# Patient Record
Sex: FEMALE | Race: ASIAN | NOT HISPANIC OR LATINO
[De-identification: names, ages, dates, MRNs, and addresses within clinical notes are randomized per-mention and may not be internally consistent; named-entity substitution may affect disease eponyms.]

---

## 2017-01-24 PROBLEM — Z00.00 ENCOUNTER FOR PREVENTIVE HEALTH EXAMINATION: Status: ACTIVE | Noted: 2017-01-24

## 2017-01-30 ENCOUNTER — APPOINTMENT (OUTPATIENT)
Dept: CARDIOLOGY | Facility: CLINIC | Age: 67
End: 2017-01-30

## 2017-01-30 ENCOUNTER — FORM ENCOUNTER (OUTPATIENT)
Age: 67
End: 2017-01-30

## 2017-01-30 ENCOUNTER — NON-APPOINTMENT (OUTPATIENT)
Age: 67
End: 2017-01-30

## 2017-01-30 VITALS
WEIGHT: 119 LBS | SYSTOLIC BLOOD PRESSURE: 151 MMHG | TEMPERATURE: 98.4 F | BODY MASS INDEX: 20.32 KG/M2 | HEIGHT: 64 IN | HEART RATE: 71 BPM | RESPIRATION RATE: 17 BRPM | DIASTOLIC BLOOD PRESSURE: 89 MMHG | OXYGEN SATURATION: 97 %

## 2017-01-30 DIAGNOSIS — Z80.1 FAMILY HISTORY OF MALIGNANT NEOPLASM OF TRACHEA, BRONCHUS AND LUNG: ICD-10-CM

## 2017-01-30 DIAGNOSIS — E78.5 HYPERLIPIDEMIA, UNSPECIFIED: ICD-10-CM

## 2017-01-30 DIAGNOSIS — C50.919 MALIGNANT NEOPLASM OF UNSPECIFIED SITE OF UNSPECIFIED FEMALE BREAST: ICD-10-CM

## 2017-01-31 ENCOUNTER — RESULT REVIEW (OUTPATIENT)
Age: 67
End: 2017-01-31

## 2017-01-31 ENCOUNTER — OTHER (OUTPATIENT)
Age: 67
End: 2017-01-31

## 2017-02-25 PROBLEM — E78.5 HLD (HYPERLIPIDEMIA): Status: ACTIVE | Noted: 2017-02-25

## 2017-02-25 PROBLEM — Z80.1 FAMILY HISTORY OF LUNG CANCER: Status: ACTIVE | Noted: 2017-02-25

## 2017-02-25 PROBLEM — C50.919 BREAST CANCER: Status: ACTIVE | Noted: 2017-02-25

## 2017-02-25 RX ORDER — OMEPRAZOLE 40 MG/1
40 CAPSULE, DELAYED RELEASE ORAL
Refills: 0 | Status: ACTIVE | COMMUNITY

## 2017-07-26 ENCOUNTER — APPOINTMENT (OUTPATIENT)
Dept: CARDIOLOGY | Facility: CLINIC | Age: 67
End: 2017-07-26

## 2017-07-26 VITALS
BODY MASS INDEX: 21.46 KG/M2 | WEIGHT: 125 LBS | HEART RATE: 65 BPM | DIASTOLIC BLOOD PRESSURE: 80 MMHG | OXYGEN SATURATION: 96 % | SYSTOLIC BLOOD PRESSURE: 169 MMHG | RESPIRATION RATE: 18 BRPM

## 2017-08-10 ENCOUNTER — MESSAGE (OUTPATIENT)
Age: 67
End: 2017-08-10

## 2017-12-22 ENCOUNTER — APPOINTMENT (OUTPATIENT)
Dept: CARDIOLOGY | Facility: CLINIC | Age: 67
End: 2017-12-22

## 2018-01-16 ENCOUNTER — NON-APPOINTMENT (OUTPATIENT)
Age: 68
End: 2018-01-16

## 2018-01-16 ENCOUNTER — APPOINTMENT (OUTPATIENT)
Dept: CARDIOLOGY | Facility: CLINIC | Age: 68
End: 2018-01-16
Payer: MEDICARE

## 2018-01-16 VITALS
HEART RATE: 59 BPM | WEIGHT: 122 LBS | OXYGEN SATURATION: 97 % | RESPIRATION RATE: 18 BRPM | BODY MASS INDEX: 20.94 KG/M2 | DIASTOLIC BLOOD PRESSURE: 90 MMHG | TEMPERATURE: 98 F | SYSTOLIC BLOOD PRESSURE: 165 MMHG

## 2018-01-16 PROCEDURE — 93000 ELECTROCARDIOGRAM COMPLETE: CPT

## 2018-01-16 PROCEDURE — 99214 OFFICE O/P EST MOD 30 MIN: CPT

## 2018-04-16 ENCOUNTER — APPOINTMENT (OUTPATIENT)
Dept: CARDIOLOGY | Facility: CLINIC | Age: 68
End: 2018-04-16
Payer: MEDICARE

## 2018-04-16 VITALS
HEART RATE: 66 BPM | WEIGHT: 123 LBS | BODY MASS INDEX: 21.11 KG/M2 | DIASTOLIC BLOOD PRESSURE: 81 MMHG | OXYGEN SATURATION: 97 % | SYSTOLIC BLOOD PRESSURE: 154 MMHG | TEMPERATURE: 98.3 F | RESPIRATION RATE: 18 BRPM

## 2018-04-16 PROCEDURE — 99214 OFFICE O/P EST MOD 30 MIN: CPT

## 2018-09-12 ENCOUNTER — APPOINTMENT (OUTPATIENT)
Dept: CARDIOLOGY | Facility: CLINIC | Age: 68
End: 2018-09-12
Payer: MEDICARE

## 2018-09-12 ENCOUNTER — NON-APPOINTMENT (OUTPATIENT)
Age: 68
End: 2018-09-12

## 2018-09-12 VITALS
BODY MASS INDEX: 20.6 KG/M2 | RESPIRATION RATE: 18 BRPM | OXYGEN SATURATION: 97 % | TEMPERATURE: 97.9 F | WEIGHT: 120 LBS | DIASTOLIC BLOOD PRESSURE: 74 MMHG | HEART RATE: 61 BPM | SYSTOLIC BLOOD PRESSURE: 150 MMHG

## 2018-09-12 DIAGNOSIS — R42 DIZZINESS AND GIDDINESS: ICD-10-CM

## 2018-09-12 PROCEDURE — 99214 OFFICE O/P EST MOD 30 MIN: CPT

## 2018-09-12 PROCEDURE — 93000 ELECTROCARDIOGRAM COMPLETE: CPT

## 2018-09-13 ENCOUNTER — RESULT REVIEW (OUTPATIENT)
Age: 68
End: 2018-09-13

## 2019-01-07 ENCOUNTER — APPOINTMENT (OUTPATIENT)
Dept: CARDIOLOGY | Facility: CLINIC | Age: 69
End: 2019-01-07
Payer: MEDICARE

## 2019-01-07 VITALS
SYSTOLIC BLOOD PRESSURE: 152 MMHG | DIASTOLIC BLOOD PRESSURE: 78 MMHG | WEIGHT: 126 LBS | OXYGEN SATURATION: 98 % | TEMPERATURE: 97.7 F | BODY MASS INDEX: 21.63 KG/M2 | RESPIRATION RATE: 18 BRPM | HEART RATE: 56 BPM

## 2019-01-07 DIAGNOSIS — R00.2 PALPITATIONS: ICD-10-CM

## 2019-01-07 PROCEDURE — 99214 OFFICE O/P EST MOD 30 MIN: CPT

## 2019-01-07 NOTE — PHYSICAL EXAM
[General Appearance - Well Developed] : well developed [Normal Appearance] : normal appearance [Well Groomed] : well groomed [General Appearance - Well Nourished] : well nourished [No Deformities] : no deformities [General Appearance - In No Acute Distress] : no acute distress [Normal Conjunctiva] : the conjunctiva exhibited no abnormalities [Eyelids - No Xanthelasma] : the eyelids demonstrated no xanthelasmas [Normal Oral Mucosa] : normal oral mucosa [No Oral Pallor] : no oral pallor [No Oral Cyanosis] : no oral cyanosis [Normal Jugular Venous A Waves Present] : normal jugular venous A waves present [Normal Jugular Venous V Waves Present] : normal jugular venous V waves present [No Jugular Venous Hanley A Waves] : no jugular venous hanley A waves [Respiration, Rhythm And Depth] : normal respiratory rhythm and effort [Exaggerated Use Of Accessory Muscles For Inspiration] : no accessory muscle use [Auscultation Breath Sounds / Voice Sounds] : lungs were clear to auscultation bilaterally [Heart Rate And Rhythm] : heart rate and rhythm were normal [Heart Sounds] : normal S1 and S2 [Murmurs] : no murmurs present [Arterial Pulses Normal] : the arterial pulses were normal [Edema] : no peripheral edema present [Abdomen Soft] : soft [Abdomen Tenderness] : non-tender [Abdomen Mass (___ Cm)] : no abdominal mass palpated [Abnormal Walk] : normal gait [Gait - Sufficient For Exercise Testing] : the gait was sufficient for exercise testing [Nail Clubbing] : no clubbing of the fingernails [Cyanosis, Localized] : no localized cyanosis [Petechial Hemorrhages (___cm)] : no petechial hemorrhages [] : no ischemic changes [Oriented To Time, Place, And Person] : oriented to person, place, and time [Affect] : the affect was normal [Mood] : the mood was normal [No Anxiety] : not feeling anxious

## 2019-01-17 PROBLEM — R00.2 PALPITATIONS: Status: ACTIVE | Noted: 2017-01-30

## 2019-07-14 NOTE — HISTORY OF PRESENT ILLNESS
[FreeTextEntry1] : 68 -year-old female with PAT on Holter, HTN and HLD presents for followup.  Patient was last seen on 9/12/18 for dizziness.  Patient underwent a brain MRI showed no acute infarct and mild-mod small vessel ischemic changes.  Her symptom was felt to be due to vestibular dysfunction.  She is on Amlodipine/Benazepril to 5-10 mg for HTN.  She is on Metoprolol ER 25 mg for PAT on Holter.  Patient has been stable.  Patient reports that her BP is usually 110-130.  Patient denies CP, SOB, palpitations, or lightheadedness.  She reports that the tightness around her neck is better.

## 2019-07-14 NOTE — PHYSICAL EXAM
[General Appearance - Well Developed] : well developed [Normal Appearance] : normal appearance [Well Groomed] : well groomed [General Appearance - Well Nourished] : well nourished [No Deformities] : no deformities [General Appearance - In No Acute Distress] : no acute distress [Normal Conjunctiva] : the conjunctiva exhibited no abnormalities [Eyelids - No Xanthelasma] : the eyelids demonstrated no xanthelasmas [No Oral Pallor] : no oral pallor [Normal Oral Mucosa] : normal oral mucosa [No Oral Cyanosis] : no oral cyanosis [Normal Jugular Venous A Waves Present] : normal jugular venous A waves present [Normal Jugular Venous V Waves Present] : normal jugular venous V waves present [No Jugular Venous Hanley A Waves] : no jugular venous hanley A waves [Respiration, Rhythm And Depth] : normal respiratory rhythm and effort [Exaggerated Use Of Accessory Muscles For Inspiration] : no accessory muscle use [Auscultation Breath Sounds / Voice Sounds] : lungs were clear to auscultation bilaterally [Heart Rate And Rhythm] : heart rate and rhythm were normal [Heart Sounds] : normal S1 and S2 [Murmurs] : no murmurs present [Arterial Pulses Normal] : the arterial pulses were normal [Edema] : no peripheral edema present [Abdomen Soft] : soft [Abdomen Tenderness] : non-tender [Abdomen Mass (___ Cm)] : no abdominal mass palpated [Abnormal Walk] : normal gait [Gait - Sufficient For Exercise Testing] : the gait was sufficient for exercise testing [Nail Clubbing] : no clubbing of the fingernails [Cyanosis, Localized] : no localized cyanosis [Petechial Hemorrhages (___cm)] : no petechial hemorrhages [] : no ischemic changes [Oriented To Time, Place, And Person] : oriented to person, place, and time [Affect] : the affect was normal [Mood] : the mood was normal [No Anxiety] : not feeling anxious

## 2019-07-14 NOTE — DISCUSSION/SUMMARY
[FreeTextEntry1] : 68 -year-old female with PAT on Holter, HTN and HLD presents for followup.  Patient was last seen on 9/12/18 for dizziness.  Patient underwent a brain MRI showed no acute infarct and mild-mod small vessel ischemic changes.  Her symptom was felt to be due to vestibular dysfunction.  She is on Amlodipine/Benazepril to 5-10 mg for HTN.  She is on Metoprolol ER 25 mg for PAT on Holter.  Patient has been stable.  Patient reports that her BP is usually 110-130.  Patient denies CP, SOB, palpitations, or lightheadedness.  She reports that the tightness around her neck is better. \par \par (1) HTN - Her BP was elevated in the office but was normal at home.  She brought in her BP machine on 1/7/19  and it was verified to be accurate.  I advised patient to continue Amlodipine/Benazepril to 5-10 mg and Metoprolol ER 25 mg for now.  \par \par (2) PAT on Holter - Patient has been stable.  I advised patient to continue Metoprolol ER 25 mg.\par  \par (3) Followup - 6 months.

## 2019-07-15 ENCOUNTER — APPOINTMENT (OUTPATIENT)
Dept: CARDIOLOGY | Facility: CLINIC | Age: 69
End: 2019-07-15
Payer: MEDICARE

## 2019-07-15 VITALS
TEMPERATURE: 97.6 F | RESPIRATION RATE: 18 BRPM | SYSTOLIC BLOOD PRESSURE: 160 MMHG | BODY MASS INDEX: 21.46 KG/M2 | WEIGHT: 125 LBS | HEART RATE: 59 BPM | OXYGEN SATURATION: 95 % | DIASTOLIC BLOOD PRESSURE: 80 MMHG

## 2019-07-15 PROCEDURE — 99214 OFFICE O/P EST MOD 30 MIN: CPT

## 2020-01-01 NOTE — PHYSICAL EXAM
[General Appearance - Well Developed] : well developed [Normal Appearance] : normal appearance [Well Groomed] : well groomed [General Appearance - Well Nourished] : well nourished [No Deformities] : no deformities [General Appearance - In No Acute Distress] : no acute distress [Normal Conjunctiva] : the conjunctiva exhibited no abnormalities [Eyelids - No Xanthelasma] : the eyelids demonstrated no xanthelasmas [Normal Oral Mucosa] : normal oral mucosa [No Oral Cyanosis] : no oral cyanosis [No Oral Pallor] : no oral pallor [Normal Jugular Venous A Waves Present] : normal jugular venous A waves present [Normal Jugular Venous V Waves Present] : normal jugular venous V waves present [No Jugular Venous Hanley A Waves] : no jugular venous hanley A waves [Respiration, Rhythm And Depth] : normal respiratory rhythm and effort [Exaggerated Use Of Accessory Muscles For Inspiration] : no accessory muscle use [Auscultation Breath Sounds / Voice Sounds] : lungs were clear to auscultation bilaterally [Murmurs] : no murmurs present [Heart Sounds] : normal S1 and S2 [Heart Rate And Rhythm] : heart rate and rhythm were normal [Edema] : no peripheral edema present [Arterial Pulses Normal] : the arterial pulses were normal [Abdomen Soft] : soft [Abdomen Tenderness] : non-tender [Abdomen Mass (___ Cm)] : no abdominal mass palpated [Abnormal Walk] : normal gait [Gait - Sufficient For Exercise Testing] : the gait was sufficient for exercise testing [Nail Clubbing] : no clubbing of the fingernails [Petechial Hemorrhages (___cm)] : no petechial hemorrhages [Cyanosis, Localized] : no localized cyanosis [] : no ischemic changes [Affect] : the affect was normal [Oriented To Time, Place, And Person] : oriented to person, place, and time [Mood] : the mood was normal [No Anxiety] : not feeling anxious

## 2020-01-01 NOTE — HISTORY OF PRESENT ILLNESS
[FreeTextEntry1] : 68 -year-old female with PAT on Holter, HTN and HLD presents for followup.  Patient was last seen on 1/7/19.  She is on Amlodipine/Benazepril to 5-10 mg for HTN.  She is on Metoprolol ER 25 mg for PAT on Holter.  Patient is feeling well.  Patient denies CP, SOB, palpitations, or lightheadedness.  She hikes and does yoga without issues.  Patient reports that her SBP is normal at home 110-140.

## 2020-01-01 NOTE — DISCUSSION/SUMMARY
[FreeTextEntry1] : 68 -year-old female with PAT on Holter, HTN and HLD presents for followup.  Patient was last seen on 1/7/19.  She is on Amlodipine/Benazepril to 5-10 mg for HTN.  She is on Metoprolol ER 25 mg for PAT on Holter.  Patient is feeling well.  Patient denies CP, SOB, palpitations, or lightheadedness.  She hikes and does yoga without issues.  Patient reports that her SBP is normal at home 110-140. \par \par (1) HTN - Her BP was elevated in the office but was normal at home.  She brought in her BP machine on 1/7/19  and it was verified to be accurate.  I advised patient to continue Amlodipine/Benazepril  5-10 mg and Metoprolol ER 25 mg for now.  \par \par (2) PAT on Holter - Patient has been stable.  I advised patient to continue Metoprolol ER 25 mg.\par  \par (3) Followup - 6 months.

## 2020-01-02 ENCOUNTER — NON-APPOINTMENT (OUTPATIENT)
Age: 70
End: 2020-01-02

## 2020-01-02 ENCOUNTER — APPOINTMENT (OUTPATIENT)
Dept: CARDIOLOGY | Facility: CLINIC | Age: 70
End: 2020-01-02
Payer: MEDICARE

## 2020-01-02 VITALS
DIASTOLIC BLOOD PRESSURE: 83 MMHG | HEART RATE: 66 BPM | TEMPERATURE: 98.3 F | OXYGEN SATURATION: 95 % | SYSTOLIC BLOOD PRESSURE: 150 MMHG | RESPIRATION RATE: 18 BRPM | WEIGHT: 124 LBS | BODY MASS INDEX: 21.28 KG/M2

## 2020-01-02 PROCEDURE — 93000 ELECTROCARDIOGRAM COMPLETE: CPT

## 2020-01-02 PROCEDURE — 99214 OFFICE O/P EST MOD 30 MIN: CPT

## 2020-08-12 NOTE — PHYSICAL EXAM
[General Appearance - Well Developed] : well developed [Normal Appearance] : normal appearance [General Appearance - Well Nourished] : well nourished [Well Groomed] : well groomed [Normal Conjunctiva] : the conjunctiva exhibited no abnormalities [No Deformities] : no deformities [General Appearance - In No Acute Distress] : no acute distress [Eyelids - No Xanthelasma] : the eyelids demonstrated no xanthelasmas [No Oral Pallor] : no oral pallor [Normal Oral Mucosa] : normal oral mucosa [Normal Jugular Venous V Waves Present] : normal jugular venous V waves present [Normal Jugular Venous A Waves Present] : normal jugular venous A waves present [No Oral Cyanosis] : no oral cyanosis [No Jugular Venous Hanley A Waves] : no jugular venous hanley A waves [Respiration, Rhythm And Depth] : normal respiratory rhythm and effort [Heart Rate And Rhythm] : heart rate and rhythm were normal [Exaggerated Use Of Accessory Muscles For Inspiration] : no accessory muscle use [Auscultation Breath Sounds / Voice Sounds] : lungs were clear to auscultation bilaterally [Arterial Pulses Normal] : the arterial pulses were normal [Murmurs] : no murmurs present [Heart Sounds] : normal S1 and S2 [Edema] : no peripheral edema present [Abdomen Tenderness] : non-tender [Abdomen Soft] : soft [Abnormal Walk] : normal gait [Gait - Sufficient For Exercise Testing] : the gait was sufficient for exercise testing [Abdomen Mass (___ Cm)] : no abdominal mass palpated [Cyanosis, Localized] : no localized cyanosis [Nail Clubbing] : no clubbing of the fingernails [Petechial Hemorrhages (___cm)] : no petechial hemorrhages [Affect] : the affect was normal [] : no ischemic changes [Oriented To Time, Place, And Person] : oriented to person, place, and time [No Anxiety] : not feeling anxious [Mood] : the mood was normal

## 2020-08-12 NOTE — HISTORY OF PRESENT ILLNESS
[FreeTextEntry1] : 69 -year-old female with PAT on Holter, HTN and HLD presents for followup.  Patient was last seen on 7/15/19.  She is on Amlodipine/Benazepril to 5-10 mg for HTN.  She is on Metoprolol ER 25 mg for PAT on Holter.  \par \par Patient reports feeling well.  Patient denies CP, SOB, palpitations, or lightheadedness.  Her SBP at home is between 108-147.  Her machine was verified to be accurate on 1/7/19.  She reports feeling more fatigued.  Patient walks daily and goes hiking.

## 2020-08-12 NOTE — DISCUSSION/SUMMARY
[FreeTextEntry1] : 69 -year-old female with PAT on Holter, HTN and HLD presents for followup.  Patient was last seen on 7/15/19.  She is on Amlodipine/Benazepril to 5-10 mg for HTN.  She is on Metoprolol ER 25 mg for PAT on Holter.  \par \par Patient reports feeling well.  Patient denies CP, SOB, palpitations, or lightheadedness.  Her SBP at home is between 108-147.  Her machine was verified to be accurate on 1/7/19.  She reports feeling more fatigued.  Patient walks daily and goes hiking. \par \par (1) HTN - Her BP was elevated in the office but was normal at home.  She brought in her BP machine on 1/7/19  and it was verified to be accurate.  I advised patient to continue Amlodipine/Benazepril  5-10 mg and Metoprolol ER 25 mg for now.  \par \par (2) PAT on Holter - Patient has been stable.  I advised patient to continue Metoprolol ER 25 mg.\par  \par (3) Followup - 6 months.

## 2020-08-13 ENCOUNTER — APPOINTMENT (OUTPATIENT)
Dept: CARDIOLOGY | Facility: CLINIC | Age: 70
End: 2020-08-13
Payer: MEDICARE

## 2020-08-13 VITALS
RESPIRATION RATE: 18 BRPM | SYSTOLIC BLOOD PRESSURE: 166 MMHG | OXYGEN SATURATION: 97 % | WEIGHT: 126 LBS | DIASTOLIC BLOOD PRESSURE: 82 MMHG | BODY MASS INDEX: 21.63 KG/M2 | TEMPERATURE: 98.4 F | HEART RATE: 63 BPM

## 2020-08-13 PROCEDURE — 99214 OFFICE O/P EST MOD 30 MIN: CPT

## 2020-08-13 RX ORDER — HYDROCHLOROTHIAZIDE 12.5 MG/1
12.5 TABLET ORAL
Qty: 45 | Refills: 1 | Status: ACTIVE | COMMUNITY
Start: 2020-08-13 | End: 1900-01-01

## 2020-08-24 NOTE — REASON FOR VISIT
[Follow-Up - Clinic] : a clinic follow-up of [Hypertension] : hypertension [FreeTextEntry1] : Patient has been stable.  Patient denies CP, SOB, palpitations, or lightheadedness.  BP mildly elevated at home at times.  I advised patient to start HCTZ 12.5 mg QOD.  Followup - 6 months.

## 2020-08-24 NOTE — HISTORY OF PRESENT ILLNESS
[FreeTextEntry1] : 69 -year-old female with PAT on Holter, HTN and HLD presents for followup.  Patient was last seen on 1/2/20.  She is on Amlodipine/Benazepril to 5-10 mg for HTN.  She is on Metoprolol ER 25 mg for PAT on Holter.  \par \par ------------------------------------------------------------------------------------------------------------- \par previous visit summary:\par \par (1) HTN - Her BP was elevated in the office but was normal at home.  She brought in her BP machine on 1/7/19  and it was verified to be accurate.  I advised patient to continue Amlodipine/Benazepril  5-10 mg and Metoprolol ER 25 mg for now.  \par \par (2) PAT on Holter - Patient has been stable.  I advised patient to continue Metoprolol ER 25 mg.\par  \par (3) Followup - 6 months.\par \par Old note - \par \par Patient reports feeling well.  Patient denies CP, SOB, palpitations, or lightheadedness.  Her SBP at home is between 108-147.  Her machine was verified to be accurate on 1/7/19.  She reports feeling more fatigued.  Patient walks daily and goes hiking.

## 2021-01-11 ENCOUNTER — NON-APPOINTMENT (OUTPATIENT)
Age: 71
End: 2021-01-11

## 2021-01-11 ENCOUNTER — APPOINTMENT (OUTPATIENT)
Dept: CARDIOLOGY | Facility: CLINIC | Age: 71
End: 2021-01-11
Payer: MEDICARE

## 2021-01-11 VITALS
WEIGHT: 125 LBS | BODY MASS INDEX: 21.46 KG/M2 | HEART RATE: 66 BPM | SYSTOLIC BLOOD PRESSURE: 170 MMHG | DIASTOLIC BLOOD PRESSURE: 77 MMHG | RESPIRATION RATE: 18 BRPM | OXYGEN SATURATION: 97 % | TEMPERATURE: 96.7 F

## 2021-01-11 DIAGNOSIS — R07.89 OTHER CHEST PAIN: ICD-10-CM

## 2021-01-11 PROCEDURE — 93015 CV STRESS TEST SUPVJ I&R: CPT

## 2021-01-11 PROCEDURE — 99072 ADDL SUPL MATRL&STAF TM PHE: CPT

## 2021-01-11 PROCEDURE — 93306 TTE W/DOPPLER COMPLETE: CPT

## 2021-01-11 PROCEDURE — 99214 OFFICE O/P EST MOD 30 MIN: CPT | Mod: 25

## 2021-01-11 PROCEDURE — 93000 ELECTROCARDIOGRAM COMPLETE: CPT | Mod: 59

## 2021-02-08 NOTE — DISCUSSION/SUMMARY
[FreeTextEntry1] : 70-year-old female with PAT on Holter, HTN and HLD presents for followup.  \par \par Patient was last seen on 8/3/20.   Her BP was mildly elevated at home at times.  I advised patient to start HCTZ 12.5 mg QOD.  She is on Amlodipine/Benazepril to 5-10 mg for HTN.  She is on Metoprolol ER 25 mg for PAT on Holter.  \par \par 1/11/21 - Patient reports episodes of left lower CP, described as sharp pain, lasting seconds, not related to exertion.  She is on Amlodipine/Benazepril to 5-10 mg for HTN.  She is on Metoprolol ER 25 mg for PAT on Holter.  Her BP was elevated but normal at home.  She brought in her home BP and it was verified to be accurate.  I advised patient to undergo an echocardiogram and a treadmill stress test. \par \par (1) CP, non-exertional - Patient underwent an echocardiogram and it showed normal LV function without significant valvular pathology. Patient underwent a treadmill stress test and completed 8 minutes of Yanick protocol.  There were non-diagnostic ST depressions on ECG but no symptoms.  Following treadmill stress, there was no echocardiographic evidence of ischemia.  Patient was reassured. \par \par (2) HTN - Her BP was elevated in the office but was normal at home.  She brought in her BP machine on 1/7/19  and it was verified to be accurate.  I advised patient to continue Amlodipine/Benazepril  5-10 mg, HCTZ 12.5 mg QOD, and Metoprolol ER 25 mg for now.  \par \par (3) PAT on Holter - Patient has been stable.  I advised patient to continue Metoprolol ER 25 mg.\par  \par (4) Followup - 6 months.

## 2021-02-08 NOTE — REASON FOR VISIT
[Follow-Up - Clinic] : a clinic follow-up of [Hypertension] : hypertension [FreeTextEntry1] : 1/11/21 - Patient reports episodes of left lower CP, described as sharp pain, lasting seconds, not related to exertion.  She is on Amlodipine/Benazepril to 5-10 mg for HTN.  She is on Metoprolol ER 25 mg for PAT on Holter.  Her BP was elevated but normal at home.  She brought in her home BP and it was verified to be accurate.  I advised patient to undergo an echocardiogram and a treadmill stress test. \par \par 8/3/20 - Patient has been stable.  Patient denies CP, SOB, palpitations, or lightheadedness.  BP mildly elevated at home at times.  I advised patient to start HCTZ 12.5 mg QOD.  Followup - 6 months.

## 2021-02-08 NOTE — HISTORY OF PRESENT ILLNESS
[FreeTextEntry1] : 70-year-old female with PAT on Holter, HTN and HLD presents for followup.  \par \par Patient was last seen on 8/3/20.   Her BP was mildly elevated at home at times.  I advised patient to start HCTZ 12.5 mg QOD.  She is on Amlodipine/Benazepril to 5-10 mg for HTN.  She is on Metoprolol ER 25 mg for PAT on Holter.  \par \par 1/11/21 - Patient reports episodes of left lower CP, described as sharp pain, lasting seconds, not related to exertion.  She is on Amlodipine/Benazepril to 5-10 mg for HTN.  She is on Metoprolol ER 25 mg for PAT on Holter.  Her BP was elevated but normal at home.  She brought in her home BP and it was verified to be accurate.  I advised patient to undergo an echocardiogram and a treadmill stress test. \par

## 2022-05-24 ENCOUNTER — NON-APPOINTMENT (OUTPATIENT)
Age: 72
End: 2022-05-24

## 2022-05-24 ENCOUNTER — APPOINTMENT (OUTPATIENT)
Dept: CARDIOLOGY | Facility: CLINIC | Age: 72
End: 2022-05-24
Payer: MEDICARE

## 2022-05-24 VITALS
DIASTOLIC BLOOD PRESSURE: 85 MMHG | RESPIRATION RATE: 18 BRPM | TEMPERATURE: 97.7 F | OXYGEN SATURATION: 100 % | HEART RATE: 64 BPM | SYSTOLIC BLOOD PRESSURE: 171 MMHG | WEIGHT: 116 LBS | BODY MASS INDEX: 19.91 KG/M2

## 2022-05-24 PROCEDURE — 99213 OFFICE O/P EST LOW 20 MIN: CPT

## 2022-05-24 PROCEDURE — 93000 ELECTROCARDIOGRAM COMPLETE: CPT

## 2022-05-24 NOTE — HISTORY OF PRESENT ILLNESS
[FreeTextEntry1] : 5/24/22 - Patient has been stable.  Patient denies CP, SOB, or palpitations.  Her BP was elevated in office but normal at home.  Her home BP machine was verified in the past.  Her ECG showed no changes.  I advised patient to continue current medications.  FU 1 year. \par \par 1/11/21 - Patient reports episodes of left lower CP, described as sharp pain, lasting seconds, not related to exertion.  She is on Amlodipine/Benazepril to 5-10 mg for HTN.  She is on Metoprolol ER 25 mg for PAT on Holter.  Her BP was elevated but normal at home.  She brought in her home BP and it was verified to be accurate.  I advised patient to undergo an echocardiogram and a treadmill stress test. \par \par 8/3/20 - Patient has been stable.  Patient denies CP, SOB, palpitations, or lightheadedness.  BP mildly elevated at home at times.  I advised patient to start HCTZ 12.5 mg QOD.  Followup - 6 months.

## 2022-05-24 NOTE — REASON FOR VISIT
[FreeTextEntry1] : 71-year-old female with PAT on Holter, HTN and HLD presents for followup.  \par \par Patient was last seen on 1/11/21 for episodes of left lower CP, described as sharp pain, lasting seconds, not related to exertion. Her BP was elevated but normal at home.  She brought in her home BP and it was verified to be accurate.  I advised patient to undergo an echocardiogram and a treadmill stress test.  Patient underwent an echocardiogram and it showed normal LV function without significant valvular pathology. Patient underwent a treadmill stress test and completed 8 minutes of Yanick protocol.  There were non-diagnostic ST depressions on ECG but no symptoms.  Following treadmill stress, there was no echocardiographic evidence of ischemia. \par \par She is on Amlodipine/Benazepril to 5-10 mg for HTN.  She is on Metoprolol ER 25 mg for PAT on Holter.  \par \par \par

## 2022-12-10 PROBLEM — I10 HTN (HYPERTENSION): Status: ACTIVE | Noted: 2017-02-25

## 2022-12-10 PROBLEM — I47.1 PAT (PAROXYSMAL ATRIAL TACHYCARDIA): Status: ACTIVE | Noted: 2019-07-21

## 2022-12-10 NOTE — PHYSICAL EXAM
[General Appearance - Well Developed] : well developed [Normal Appearance] : normal appearance [Well Groomed] : well groomed [General Appearance - Well Nourished] : well nourished [No Deformities] : no deformities [General Appearance - In No Acute Distress] : no acute distress [Normal Conjunctiva] : the conjunctiva exhibited no abnormalities [Eyelids - No Xanthelasma] : the eyelids demonstrated no xanthelasmas [Normal Oral Mucosa] : normal oral mucosa [No Oral Pallor] : no oral pallor [No Oral Cyanosis] : no oral cyanosis [Normal Jugular Venous A Waves Present] : normal jugular venous A waves present [Normal Jugular Venous V Waves Present] : normal jugular venous V waves present [No Jugular Venous Hnaley A Waves] : no jugular venous hanley A waves [Respiration, Rhythm And Depth] : normal respiratory rhythm and effort [Exaggerated Use Of Accessory Muscles For Inspiration] : no accessory muscle use [Auscultation Breath Sounds / Voice Sounds] : lungs were clear to auscultation bilaterally [Heart Rate And Rhythm] : heart rate and rhythm were normal [Heart Sounds] : normal S1 and S2 [Murmurs] : no murmurs present [Arterial Pulses Normal] : the arterial pulses were normal [Edema] : no peripheral edema present [Abdomen Soft] : soft [Abdomen Tenderness] : non-tender [Abdomen Mass (___ Cm)] : no abdominal mass palpated [Abnormal Walk] : normal gait [Gait - Sufficient For Exercise Testing] : the gait was sufficient for exercise testing [Nail Clubbing] : no clubbing of the fingernails [Cyanosis, Localized] : no localized cyanosis [Petechial Hemorrhages (___cm)] : no petechial hemorrhages [] : no ischemic changes [Oriented To Time, Place, And Person] : oriented to person, place, and time [Affect] : the affect was normal [Mood] : the mood was normal [No Anxiety] : not feeling anxious

## 2022-12-12 ENCOUNTER — APPOINTMENT (OUTPATIENT)
Dept: CARDIOLOGY | Facility: CLINIC | Age: 72
End: 2022-12-12

## 2022-12-12 VITALS
BODY MASS INDEX: 17.17 KG/M2 | SYSTOLIC BLOOD PRESSURE: 156 MMHG | RESPIRATION RATE: 18 BRPM | DIASTOLIC BLOOD PRESSURE: 75 MMHG | WEIGHT: 100 LBS | TEMPERATURE: 96.8 F | OXYGEN SATURATION: 96 % | HEART RATE: 68 BPM

## 2022-12-12 DIAGNOSIS — I47.1 SUPRAVENTRICULAR TACHYCARDIA: ICD-10-CM

## 2022-12-12 DIAGNOSIS — I10 ESSENTIAL (PRIMARY) HYPERTENSION: ICD-10-CM

## 2022-12-12 PROCEDURE — 99214 OFFICE O/P EST MOD 30 MIN: CPT

## 2022-12-12 NOTE — HISTORY OF PRESENT ILLNESS
Notified patient  was running a lil behind. Pt logining on now for virtual.   [FreeTextEntry1] : 12/12/22 - Patient just came back from [facility] in Nov.  She was very busy and lost 16 lbs.  Patient denies N/V/D.  She had EGD and colonoscopy last year which were unremarkable  Patient denies CP or SOB.  Patient reports occasional palpitations lasting seconds.  She exercises daily.  She has premature beats on exam.   Her BP was elevated in office but normal at home.  Her home BP machine was verified in the past.  I advised patient to wear a Holter monitor. \par \par 5/24/22 - Patient has been stable.  Patient denies CP, SOB, or palpitations.  Her BP was elevated in office but normal at home.  Her home BP machine was verified in the past.  Her ECG showed no changes.  I advised patient to continue current medications.  FU 1 year. \par \par 1/11/21 - Patient reports episodes of left lower CP, described as sharp pain, lasting seconds, not related to exertion.  She is on Amlodipine/Benazepril to 5-10 mg for HTN.  She is on Metoprolol ER 25 mg for PAT on Holter.  Her BP was elevated but normal at home.  She brought in her home BP and it was verified to be accurate.  I advised patient to undergo an echocardiogram and a treadmill stress test. \par \par 8/3/20 - Patient has been stable.  Patient denies CP, SOB, palpitations, or lightheadedness.  BP mildly elevated at home at times.  I advised patient to start HCTZ 12.5 mg QOD.  Followup - 6 months.

## 2022-12-12 NOTE — DISCUSSION/SUMMARY
[FreeTextEntry1] : 72-year-old female with PAT on Holter, HTN, HLD, presents for followup.  \par \par Patient was last seen on 5/24/22 for followup.  I advised patient to continue current medications.  \par \par She is on Amlodipine/Benazepril to 5-10 mg and HCTZ 12.5 mg QOD for HTN.  She is on Metoprolol ER 25 mg for PAT on Holter.  \par \par Patient underwent an echocardiogram 1/11/21 and it showed normal LV function without significant valvular pathology. \par \par Patient underwent a treadmill stress test 1/11/21 and completed 8 minutes of Yanick protocol.  There were non-diagnostic ST depressions on ECG but no symptoms.  Following treadmill stress, there was no echocardiographic evidence of ischemia. \par \par 12/12/22 - Patient just came back from [facility] in Nov.  She was very busy and lost 16 lbs.  Patient denies N/V/D.  She had EGD and colonoscopy last year which were unremarkable  Patient denies CP or SOB.  Patient reports occasional palpitations lasting seconds.  She exercises daily.  She has premature beats on exam.   Her BP was elevated in office but normal at home.  Her home BP machine was verified in the past.  I advised patient to wear a Holter monitor. \par \par (1) Palpitations - Her symptom is likely due to isolated APC's or PVC's.  I advised patient to wear a Holter monitor.  I advised patient to continue Metoprolol ER 25 mg.\par \par (2) HTN - Her BP was elevated in office but normal at home.  I advised patient to continue  Amlodipine/Benazepril to 5-10 mg and HCTZ 12.5 mg QOD for HTN.\par \par (3) Followup - pending Holter.

## 2022-12-12 NOTE — REASON FOR VISIT
Detail Level: Detailed General Sunscreen Counseling: I recommended a broad spectrum sunscreen with a SPF of 30 or higher. I explained that SPF 30 sunscreens block approximately 97 percent of the sun's harmful rays. Sunscreens should be applied at least 15 minutes prior to expected sun exposure and then every 2 hours after that as long as sun exposure continues. If swimming or exercising sunscreen should be reapplied every 45 minutes to an hour after getting wet or sweating. One ounce, or the equivalent of a shot glass full of sunscreen, is adequate to protect the skin not covered by a bathing suit. I also recommended a lip balm with a sunscreen as well. Sun protective clothing can be used in lieu of sunscreen but must be worn the entire time you are exposed to the sun's rays. [FreeTextEntry3] : FRANKLIN Hanna [FreeTextEntry1] : 72-year-old female with PAT on Holter, HTN, HLD, presents for followup.  \par \par Patient was last seen on 5/24/22 for followup.  I advised patient to continue current medications.  \par \par She is on Amlodipine/Benazepril to 5-10 mg and HCTZ 12.5 mg QOD for HTN.  She is on Metoprolol ER 25 mg for PAT on Holter.  \par \par Patient underwent an echocardiogram 1/11/21 and it showed normal LV function without significant valvular pathology. \par \par Patient underwent a treadmill stress test 1/11/21 and completed 8 minutes of [place] protocol.  There were non-diagnostic ST depressions on ECG but no symptoms.  Following treadmill stress, there was no echocardiographic evidence of ischemia. \par

## 2022-12-15 ENCOUNTER — NON-APPOINTMENT (OUTPATIENT)
Age: 72
End: 2022-12-15

## 2022-12-16 ENCOUNTER — NON-APPOINTMENT (OUTPATIENT)
Age: 72
End: 2022-12-16

## 2023-03-10 RX ORDER — METOPROLOL SUCCINATE 25 MG/1
25 TABLET, EXTENDED RELEASE ORAL DAILY
Qty: 90 | Refills: 1 | Status: ACTIVE | COMMUNITY
Start: 2017-01-31 | End: 1900-01-01

## 2023-03-10 RX ORDER — AMLODIPINE BESYLATE AND BENAZEPRIL HYDROCHLORIDE 5; 10 MG/1; MG/1
5-10 CAPSULE ORAL
Qty: 90 | Refills: 1 | Status: ACTIVE | COMMUNITY
Start: 1900-01-01 | End: 1900-01-01

## 2023-03-10 RX ORDER — SIMVASTATIN 10 MG/1
10 TABLET, FILM COATED ORAL
Qty: 90 | Refills: 1 | Status: ACTIVE | COMMUNITY
Start: 1900-01-01 | End: 1900-01-01